# Patient Record
Sex: FEMALE | Employment: STUDENT | URBAN - METROPOLITAN AREA
[De-identification: names, ages, dates, MRNs, and addresses within clinical notes are randomized per-mention and may not be internally consistent; named-entity substitution may affect disease eponyms.]

---

## 2019-02-11 ENCOUNTER — OFFICE VISIT (OUTPATIENT)
Dept: OBGYN CLINIC | Facility: CLINIC | Age: 24
End: 2019-02-11
Payer: COMMERCIAL

## 2019-02-11 VITALS — BODY MASS INDEX: 26.22 KG/M2 | HEIGHT: 63 IN | WEIGHT: 148 LBS

## 2019-02-11 DIAGNOSIS — R29.898 WEAKNESS OF BOTH HIPS: ICD-10-CM

## 2019-02-11 DIAGNOSIS — M76.51 PATELLAR TENDINITIS, RIGHT KNEE: ICD-10-CM

## 2019-02-11 DIAGNOSIS — M22.2X2 PATELLOFEMORAL SYNDROME OF BOTH KNEES: Primary | ICD-10-CM

## 2019-02-11 DIAGNOSIS — M62.9 HAMSTRING TIGHTNESS OF BOTH LOWER EXTREMITIES: ICD-10-CM

## 2019-02-11 DIAGNOSIS — M22.2X1 PATELLOFEMORAL SYNDROME OF BOTH KNEES: Primary | ICD-10-CM

## 2019-02-11 PROCEDURE — 99244 OFF/OP CNSLTJ NEW/EST MOD 40: CPT | Performed by: FAMILY MEDICINE

## 2019-02-11 RX ORDER — FLUTICASONE FUROATE AND VILANTEROL TRIFENATATE 100; 25 UG/1; UG/1
POWDER RESPIRATORY (INHALATION)
Refills: 4 | COMMUNITY
Start: 2019-01-25

## 2019-02-11 RX ORDER — LEVOCETIRIZINE DIHYDROCHLORIDE 5 MG/1
5 TABLET, FILM COATED ORAL DAILY
Refills: 1 | COMMUNITY
Start: 2018-12-28

## 2019-02-11 RX ORDER — NAPROXEN 500 MG/1
500 TABLET ORAL 2 TIMES DAILY WITH MEALS
Qty: 30 TABLET | Refills: 0 | Status: SHIPPED | OUTPATIENT
Start: 2019-02-11

## 2019-02-11 RX ORDER — DESONIDE 0.5 MG/G
OINTMENT TOPICAL
Refills: 1 | COMMUNITY
Start: 2018-12-28

## 2019-02-11 RX ORDER — MONTELUKAST SODIUM 10 MG/1
10 TABLET ORAL DAILY
Refills: 2 | COMMUNITY
Start: 2019-01-25

## 2019-02-11 NOTE — LETTER
February 11, 2019     Joseph Forman, DO  [address] [facility] [address]    Patient: Thierno Rangel   YOB: 1995   Date of Visit: 2/11/2019       Dear Dr Rafael Clarke:    Thank you for referring Thierno Rangel to me for evaluation  Below are my notes for this consultation  If you have questions, please do not hesitate to call me  I look forward to following your patient along with you  Sincerely,        [employer], DO        CC: No Recipients  [employer], DO  2/11/2019  5:34 PM  Sign at close encounter  Assessment/Plan:  Assessment/Plan   Diagnoses and all orders for this visit:    Patellofemoral syndrome of both knees  -     Ambulatory referral to Physical Therapy; Future  -     Brace    Patellar tendinitis, right knee  -     Ambulatory referral to Physical Therapy; Future  -     naproxen (NAPROSYN) 500 mg tablet; Take 1 tablet (500 mg total) by mouth 2 (two) times a day with meals    Weakness of both hips  -     Ambulatory referral to Physical Therapy; Future    Hamstring tightness of both lower extremities  -     Ambulatory referral to Physical Therapy; Future    Other orders  -     BREO ELLIPTA 100-25 MCG/INH inhaler; INHALE 1 PUFF ONCE A DAY  -     montelukast (SINGULAIR) 10 mg tablet; Take 10 mg by mouth daily  -     levocetirizine (XYZAL) 5 MG tablet; Take 5 mg by mouth daily  -     desonide (DESOWEN) 0 05 % ointment; APPLY TWICE DAILY AS NEEDED TO AREA OF CONCERN        26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 [employer] [employer] [facility] [employer] with right knee pain of more than 8 months duration  Discussed with patient physical exam, impression and plan  Physical exam noted for tenderness of the patellar undersurface of right knee and of the patellar tendon and tibial tuberosity, and there is reproduction of pain with patellar inhibition and grind of both knees  She also has weakness of both hips and hamstring tightness of both lower extremities    Clinical impression is patellofemoral syndrome and patellar tendinitis  I discussed treatment regimen of anti-inflammatory, knee brace, and physical therapy to which she agreed  She is to take naproxen 500 mg twice daily food consistently for 2 weeks and start physical therapy as soon as possible and do home exercise directed  She will follow up in 6 weeks at which point she will be re-evaluated  Subjective:   Patient ID: Abdullahi Bejarano is a [address] y o  female  Chief Complaint   Patient presents with    Right Knee - Pain       32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 [employer] [facility] presents for evaluation of right knee pain of more than 8 months duration  She denies any trauma or inciting event  Pain described as gradual onset, localized to anterior aspect knee, achy and sometimes sharp, nonradiating, worse with running and jumping activities, not associated swelling, and improved with rest   She reports being previously evaluated by an orthopedist and was advised to rest from running activities and to wear a knee brace  She obtain knee brace from a local store and states that with rest her pain improved, however she resumed running activity 2-3 months later and then pain returned  She then saw another practitioner reports having had her kneecap adjusted  She states that the pain has been gradually worsening, and now affects her with going up and down stairs and with her exercises such as elliptical stair climber machines  Knee Pain   This is a chronic problem  The current episode started more than 1 month ago  The problem occurs intermittently  The problem has been gradually worsening  Associated symptoms include arthralgias  Pertinent negatives include no abdominal pain, chest pain, chills, fever, joint swelling, numbness, rash, sore throat or weakness  The symptoms are aggravated by twisting (Running, stairs)  She has tried rest for the symptoms  The treatment provided mild relief             The following portions of the patient's history were reviewed and updated as appropriate: She  has no past medical history on file  She  has no past surgical history on file  Her family history is not on file  She  reports that she has never smoked  She does not have any smokeless tobacco history on file  Her alcohol and drug histories are not on file  She has No Known Allergies       Review of Systems   Constitutional: Negative for chills and fever  HENT: Negative for sore throat  Eyes: Negative for visual disturbance  Respiratory: Negative for shortness of breath  Cardiovascular: Negative for chest pain  Gastrointestinal: Negative for abdominal pain  Genitourinary: Negative for flank pain  Musculoskeletal: Positive for arthralgias  Negative for joint swelling  Skin: Negative for rash and wound  Neurological: Negative for weakness and numbness  Hematological: Does not bruise/bleed easily  Psychiatric/Behavioral: Negative for self-injury  Objective:  Vitals:    02/11/19 0857   Weight: 67 1 kg (148 lb)   Height: 5' 2 5" (1 588 m)     Right Ankle Exam     Tenderness   The patient is experiencing no tenderness  Muscle Strength   Dorsiflexion:  5/5  Plantar flexion:  5/5      Right Knee Exam     Muscle Strength   The patient has normal right knee strength  Tenderness   The patient is experiencing tenderness in the patellar tendon (Patellar undersurface, tibial tuberosity)  Range of Motion   The patient has normal right knee ROM  Tests   Joyce:  Medial - negative Lateral - negative  Varus: negative Valgus: negative  Lachman:  Anterior - negative        Other   Swelling: none  Effusion: no effusion present    Comments:  Positive patellar inhibition and grind      Left Knee Exam     Muscle Strength   The patient has normal left knee strength  Range of Motion   The patient has normal left knee ROM      Other   Swelling: none  Effusion: no effusion present    Comments:  Positive patellar inhibition and grind      Right Hip Exam     Muscle Strength   Abduction: 4/5   Flexion: 5/5     Tests   SHIRLEY: negative    Comments:  Negative FADDIR  Hamstring tightness      Left Hip Exam     Muscle Strength   Abduction: 4/5   Flexion: 5/5     Tests   SHIRLEY: negative    Comments:  Negative FADDIR  Hamstring tightness          Observations   Left Knee   Negative for effusion  Right Knee   Negative for effusion  Strength/Myotome Testing     Right Ankle/Foot   Dorsiflexion: 5  Plantar flexion: 5      Physical Exam   Constitutional: She is oriented to person, place, and time  She appears well-developed  No distress  HENT:   Head: Normocephalic  Eyes: Conjunctivae are normal    Neck: No tracheal deviation present  Cardiovascular: Normal rate  Pulmonary/Chest: Effort normal  No respiratory distress  Abdominal: She exhibits no distension  Musculoskeletal: She exhibits tenderness  Right knee: She exhibits no effusion  Left knee: She exhibits no effusion  Neurological: She is alert and oriented to person, place, and time  Skin: Skin is warm and dry  Psychiatric: She has a normal mood and affect  Her behavior is normal    Nursing note and vitals reviewed

## 2019-02-11 NOTE — PROGRESS NOTES
Assessment/Plan:  Assessment/Plan   Diagnoses and all orders for this visit:    Patellofemoral syndrome of both knees  -     Ambulatory referral to Physical Therapy; Future  -     Brace    Patellar tendinitis, right knee  -     Ambulatory referral to Physical Therapy; Future  -     naproxen (NAPROSYN) 500 mg tablet; Take 1 tablet (500 mg total) by mouth 2 (two) times a day with meals    Weakness of both hips  -     Ambulatory referral to Physical Therapy; Future    Hamstring tightness of both lower extremities  -     Ambulatory referral to Physical Therapy; Future    Other orders  -     BREO ELLIPTA 100-25 MCG/INH inhaler; INHALE 1 PUFF ONCE A DAY  -     montelukast (SINGULAIR) 10 mg tablet; Take 10 mg by mouth daily  -     levocetirizine (XYZAL) 5 MG tablet; Take 5 mg by mouth daily  -     desonide (DESOWEN) 0 05 % ointment; APPLY TWICE DAILY AS NEEDED TO AREA OF CONCERN        28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 [employer] with right knee pain of more than 8 months duration  Discussed with patient physical exam, impression and plan  Physical exam noted for tenderness of the patellar undersurface of right knee and of the patellar tendon and tibial tuberosity, and there is reproduction of pain with patellar inhibition and grind of both knees  She also has weakness of both hips and hamstring tightness of both lower extremities  Clinical impression is patellofemoral syndrome and patellar tendinitis  I discussed treatment regimen of anti-inflammatory, knee brace, and physical therapy to which she agreed  She is to take naproxen 500 mg twice daily food consistently for 2 weeks and start physical therapy as soon as possible and do home exercise directed  She will follow up in 6 weeks at which point she will be re-evaluated  Subjective:   Patient ID: Danielle Romano is a 26 y o  female    Chief Complaint   Patient presents with    Right Knee - Pain       28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 [employer] presents for evaluation of right knee pain of more than 8 months duration  She denies any trauma or inciting event  Pain described as gradual onset, localized to anterior aspect knee, achy and sometimes sharp, nonradiating, worse with running and jumping activities, not associated swelling, and improved with rest   She reports being previously evaluated by an orthopedist and was advised to rest from running activities and to wear a knee brace  She obtain knee brace from a local store and states that with rest her pain improved, however she resumed running activity 2-3 months later and then pain returned  She then saw another practitioner reports having had her kneecap adjusted  She states that the pain has been gradually worsening, and now affects her with going up and down stairs and with her exercises such as elliptical stair climber machines  Knee Pain   This is a chronic problem  The current episode started more than 1 month ago  The problem occurs intermittently  The problem has been gradually worsening  Associated symptoms include arthralgias  Pertinent negatives include no abdominal pain, chest pain, chills, fever, joint swelling, numbness, rash, sore throat or weakness  The symptoms are aggravated by twisting (Running, stairs)  She has tried rest for the symptoms  The treatment provided mild relief  The following portions of the patient's history were reviewed and updated as appropriate: She  has no past medical history on file  She  has no past surgical history on file  Her family history is not on file  She  reports that she has never smoked  She does not have any smokeless tobacco history on file  Her alcohol and drug histories are not on file  She has No Known Allergies       Review of Systems   Constitutional: Negative for chills and fever  HENT: Negative for sore throat  Eyes: Negative for visual disturbance  Respiratory: Negative for shortness of breath  Cardiovascular: Negative for chest pain  Gastrointestinal: Negative for abdominal pain  Genitourinary: Negative for flank pain  Musculoskeletal: Positive for arthralgias  Negative for joint swelling  Skin: Negative for rash and wound  Neurological: Negative for weakness and numbness  Hematological: Does not bruise/bleed easily  Psychiatric/Behavioral: Negative for self-injury  Objective:  Vitals:    02/11/19 0857   Weight: 67 1 kg (148 lb)   Height: 5' 2 5" (1 588 m)     Right Ankle Exam     Tenderness   The patient is experiencing no tenderness  Muscle Strength   Dorsiflexion:  5/5  Plantar flexion:  5/5      Right Knee Exam     Muscle Strength   The patient has normal right knee strength  Tenderness   The patient is experiencing tenderness in the patellar tendon (Patellar undersurface, tibial tuberosity)  Range of Motion   The patient has normal right knee ROM  Tests   Joyce:  Medial - negative Lateral - negative  Varus: negative Valgus: negative  Lachman:  Anterior - negative        Other   Swelling: none  Effusion: no effusion present    Comments:  Positive patellar inhibition and grind      Left Knee Exam     Muscle Strength   The patient has normal left knee strength  Range of Motion   The patient has normal left knee ROM  Other   Swelling: none  Effusion: no effusion present    Comments:  Positive patellar inhibition and grind      Right Hip Exam     Muscle Strength   Abduction: 4/5   Flexion: 5/5     Tests   SHIRLEY: negative    Comments:  Negative FADDIR  Hamstring tightness      Left Hip Exam     Muscle Strength   Abduction: 4/5   Flexion: 5/5     Tests   SHIRLEY: negative    Comments:  Negative FADDIR  Hamstring tightness          Observations   Left Knee   Negative for effusion  Right Knee   Negative for effusion       Strength/Myotome Testing     Right Ankle/Foot   Dorsiflexion: 5  Plantar flexion: 5      Physical Exam   Constitutional: She is oriented to person, place, and time  She appears well-developed  No distress  HENT:   Head: Normocephalic  Eyes: Conjunctivae are normal    Neck: No tracheal deviation present  Cardiovascular: Normal rate  Pulmonary/Chest: Effort normal  No respiratory distress  Abdominal: She exhibits no distension  Musculoskeletal: She exhibits tenderness  Right knee: She exhibits no effusion  Left knee: She exhibits no effusion  Neurological: She is alert and oriented to person, place, and time  Skin: Skin is warm and dry  Psychiatric: She has a normal mood and affect  Her behavior is normal    Nursing note and vitals reviewed

## 2019-04-08 ENCOUNTER — TELEPHONE (OUTPATIENT)
Dept: OBGYN CLINIC | Facility: CLINIC | Age: 24
End: 2019-04-08

## 2021-04-13 DIAGNOSIS — Z23 ENCOUNTER FOR IMMUNIZATION: ICD-10-CM
